# Patient Record
(demographics unavailable — no encounter records)

---

## 2024-10-18 NOTE — PLAN
[FreeTextEntry1] : follow up labs and ekg  drawn in office   not content with chol meds  would like to decrease to 5 mg  ascvd risk currently 7.72% discussed reason for increased dose  would like to decrease to 5 mg from 10 mg  cont. to work on diet/ exercise  follow up pending with cardio   quant for work encounter form

## 2024-10-18 NOTE — HEALTH RISK ASSESSMENT
[Good] : ~his/her~  mood as  good [No] : In the past 12 months have you used drugs other than those required for medical reasons? No [0] : 2) Feeling down, depressed, or hopeless: Not at all (0) [PHQ-2 Negative - No further assessment needed] : PHQ-2 Negative - No further assessment needed [Never] : Never [Patient reported mammogram was normal] : Patient reported mammogram was normal [Patient reported PAP Smear was normal] : Patient reported PAP Smear was normal [Patient reported bone density results were normal] : Patient reported bone density results were normal [Patient reported colonoscopy was normal] : Patient reported colonoscopy was normal [HIV test declined] : HIV test declined [Hepatitis C test declined] : Hepatitis C test declined [None] : None [Feels Safe at Home] : Feels safe at home [Fully functional (bathing, dressing, toileting, transferring, walking, feeding)] : Fully functional (bathing, dressing, toileting, transferring, walking, feeding) [Fully functional (using the telephone, shopping, preparing meals, housekeeping, doing laundry, using] : Fully functional and needs no help or supervision to perform IADLs (using the telephone, shopping, preparing meals, housekeeping, doing laundry, using transportation, managing medications and managing finances) [Audit-CScore] : 0 [de-identified] : walking [de-identified] : fruits, veggies  [TVI9Lopgw] : 0 [Change in mental status noted] : No change in mental status noted [Language] : denies difficulty with language [MammogramComments] : pending late Nov  [PapSmearComments] : pending in late Nov [ColonoscopyComments] : repeat in 2027  [BoneDensityComments] : pending in late Nov

## 2024-11-01 NOTE — HISTORY OF PRESENT ILLNESS
[FreeTextEntry1] : 64F w HTN, dilated aortic root (4 cm 2017, gene + pathogenic variant FBN1) presenting for follow up  11/1/24 FU: noticed pulsations in her anterior neck x 3 mos. saw PCP, thyroid work up negative. - denies worsening of FAIR (going up stairs) - feels a bit more fatigue with walking (but not affecting QOL) - reports some dizziness since losartan was started  2/16/24 FU:  - having lightheadedness, dizziness that she notices after taking coreg - FAIR still about the same - March - May 2024 plans to go back to [facility] - plan for CPET before or after trip  12/8/23 FU: Reports a pulling chest dull discomfort (not pain) persistently present x 1 month which she only notices when she is at rest, but forgets about it when doing activities - occasional sharp chest pains that last for a few minutes without clear triggers - feels tired, with a little short of breath needing to take a break for 30 secs after 5 blocks or 3 flights of stairs - may be a slight progression of symptoms  Aortic root dilation: - Has had elevated BPs in office visits but usually normalizes on rechecks. Has a BP cuff at home. Borderline but always hovering around 120/80. - Recent echo measurements of ao root 4-4.3 cm, asc ao 3.3-3.5 cm - Unable to view records from 2017  Valvular heart disease: MR - improved after some BP management between TTE and PEGGY AR - moderate LVIDs -  2.4 cm LVEF at least 55% - has been having occasional dizziness lasting for a few minutes about 3 x a day that started after she started taking amlodipine 5 mg (2 weeks now) - monitors at home: usual sBPs 145 mm hg before amlodipine - has not been checking since starting amlodipine  FH: Sister - marfan (unsure) w a hx of aortic dissection Brother - dilated cmp

## 2024-11-01 NOTE — PHYSICAL EXAM
[Well Developed] : well developed [Well Nourished] : well nourished [No Acute Distress] : no acute distress [Normal Conjunctiva] : normal conjunctiva [Normal Venous Pressure] : normal venous pressure [No Rub] : no rub [No Gallop] : no gallop [Clear Lung Fields] : clear lung fields [Good Air Entry] : good air entry [No Respiratory Distress] : no respiratory distress  [Soft] : abdomen soft [Non Tender] : non-tender [No Masses/organomegaly] : no masses/organomegaly [Normal Bowel Sounds] : normal bowel sounds [Normal Gait] : normal gait [No Edema] : no edema [No Cyanosis] : no cyanosis [No Clubbing] : no clubbing [No Varicosities] : no varicosities [No Rash] : no rash [No Skin Lesions] : no skin lesions [Moves all extremities] : moves all extremities [No Focal Deficits] : no focal deficits [Normal Speech] : normal speech [Alert and Oriented] : alert and oriented [Normal memory] : normal memory [Murmur] : murmur [de-identified] : +right anterior pulsating mass [de-identified] : + loud SHANDA with click aortic position

## 2024-11-01 NOTE — REASON FOR VISIT
[Structural Heart and Valve Disease] : structural heart and valve disease [FreeTextEntry1] : CV Data Reviewed:  TTE 10/19/23: LVEF 55% LVIDs 2.4cm, severe MR /76, excessive leaflet motion with billowing PEGGY 10/30/23: LVEF 55% moderate MR /59 with MVP mostly A2-P2, A3-P3. MV area during diastole 5.95. Moderate AR  ms. Moderate Ao root dilation 4.3 cm. Proximal asc ao 3.4 cm.  CTA aorta 11/21/23: aortic root measured at 4.2 cm, asc ao 3.5 cm CT Chest 2017: aortic root measured at 4 cm (indexed to BSA: 2.6 , normal max for age 2.53) CT Chest 2016: aortic root measured at 3.9 cm

## 2024-11-12 NOTE — DATA REVIEWED
[FreeTextEntry1] : Cardiac MR 12/13/23 Impression: 1. The left ventricle (LV) is dilated. Hypertrophy of the basal to mid septum measuring up to 12.4 mm. Left ventricular global systolic function is normal. The LV ejection fraction is 71 %. 2. The right ventricle (RV) is normal in size. RV global systolic function is normal. The RV ejection fraction is 69 %. 3. Mitral valve prolapse with evidence of mitral annular disjunction 5.6 mm; Mitral regurgitation with regurgitant volume of 34.28 mL and regurgitant fraction 30% 4. Dilated aortic root measuring 42 mm on 3 chamber view. 5. On delayed enhancement imaging, there gross myocardial scarring. The sequences used in this study were designed for imaging cardiac structures and are suboptimal for imaging other structures and organs.  11/27/23 labs reviewed: Hgb 12.2 Hct 37.1 Plt 253 Cr 0.67   TTE 10/23/23: Left ventricular wall thickness is normal. Left ventricular systolic function is normal with an ejection fraction visually estimated at 55 to 60 %. There are no regional wall motion abnormalities seen. Normal right ventricular cavity size, wall thickness, and systolic function. The left atrium is severely dilated in size. Severe mitral regurgitation at a blood pressure of 154/76 mmHg with a central-anteriorly directed jet. There is prolapse of both leaflets with possible P2 flail. Mild tricuspid regurgitation. Estimated pulmonary artery systolic pressure is 30 mmHg. Moderate aortic regurgitation. Aortic root at the sinuses of Valsalva is dilated, measuring 4.00 cm (indexed 2.61 cm/m). Ascending aorta diameter is dilated, measuring 3.30 cm (indexed 2.15 cm/m). No pericardial effusion seen.  PEGGY 10/30/23: Normal left and right ventricular size and systolic function. Moderate mitral regurgitation. There is mitral valve prolapse of the anterior and posterior leaflets (mostly A2-P2, A3-P3) with at least 2 separate central regurgitant jets. MV area during diastole is 5.95 cm?. Moderate aortic regurgitation. The aortic regurgitant jet is central. The pressure half time is approximately 450 ms. Moderately dilated aortic root measuring 4.30 cm (indexed to BSA = 2.79 cm/mc). The proximal ascending aorta is mildly dilated measuring 3.40 cm (indexed to BSA = 2.20 cm/m?). No LA/RA/RENE/RAA thrombus seen. No evidence of an intracardiac shunt. No pericardial effusion.  CTA Chest 11/17/23: Aortic measurements as noted above. Dilated aortic root.

## 2024-11-12 NOTE — HISTORY OF PRESENT ILLNESS
[FreeTextEntry1] : 66 year old female with a past medical hx of Marfan syndrome (gene + pathogenic variant FBN1), HTN, HLD, who presents for evaluation and management of dilated aortic root and valvular disease (AI and MR with LAE). Patient is under the care of Dr. Cortez. family hx of aortic dissection (sister). Seen by cardiologist with [facility]ner for significant clinical progression/change in valvular disease - new pulsating neck mass and loud murmur in aortic position concerning for severe AI.  TRANSTHORACIC ECHOCARDIOGRAM 11/1/2024 1. Left ventricular cavity is normal in size. Left ventricular wall thickness is normal. Left ventricular systolic function is normal with an ejection fraction of 63 % by Joel's method of disks. There are no regional wall motion abnormalities seen. 2. LVIDs is 27.3 mm. 3. Normal left ventricular diastolic function, with normal left ventricular filling pressure. 4. Normal right ventricular cavity size, with normal wall thickness, and normal right ventricular systolic function. 5. Left atrium is mildly dilated. 6. Moderate mitral regurgitation. 7. Moderate-to-severe aortic regurgitation with an eccentric jet. There is some doppler flow reversal in the aortic arch during diastole. 8. Estimated pulmonary artery systolic pressure is 54 mmHg. 9. Aortic root at the sinuses of Valsalva is dilated, measuring 4.00 cm (indexed 2.69 cm/m). 10. No pericardial effusion seen. CTA chest 11/2/2024 Stable mild dilatation of the aortic root 4.2 cm.    Patient works as an , independent with high level ADLs, lives alone in an apartment with elevator, and does not need any assist device with ambulation.

## 2024-11-22 NOTE — PROCEDURE
[FreeTextEntry1] : Patient was advised to view the educational video prior to this visit regarding aortic pathology, risk factors, surgical procedures, and lifestyle modifications. Video can be retrieved at [email or web address].

## 2024-11-22 NOTE — ASSESSMENT
[FreeTextEntry1] : 66 year old female with a past medical hx of Marfan syndrome (gene + pathogenic variant FBN1), HTN, HLD, who presents for evaluation and management of dilated aortic root and valvular disease (AI and MR with LAE). Family hx of Marfan and aortic dissection (sister). NYHA Class III.  Plan:  I have reviewed the patient's medical records, diagnostic images during the time of this office consultation and have made the following recommendation. I have reviewed the indications for surgery, and used our webpage [email or web address] <[email or web address]> to illustrate the aorta and anatomy of the heart. I have discussed the indications for surgery with the patient. Those indications are the following: size greater than 5.0 cm, symptomatic aneurysms, family history of aortic dissection or aneurysm death with a size greater than 4.5 cm, other necessary cardiac procedures such as coronary artery bypass grafting or valvular disorders with an aneurysm greater than 4.5 cm, or connective tissue disorders with an aneurysm size greater than 4.5 cm.   Given her symptoms of shortness of breath and her severe aortic regurgitation/mitral regurgitation with a dilated aortic root, the patient meets the indications. I had a lengthy discussion with the patient regarding her aneurysmal and valvular disease and progression. I have recommended that the patient is a candidate for an aortic root replacement with bioAVR, mitral valve repair/replacement.  I have discussed the risks, benefits and alternatives to surgery. I have explained the risks of the surgery, including approximately 2-3% major mortality or morbidity including stroke, infection, bleeding, death, renal failure and heart attack. All questions were addressed and patient agrees to proceed with surgery.    The patient was educated on various valve options.  I have described the mechanical valve prosthesis which does require Coumadin therapy with a daily pill as well as frequent lab tests. The mechanical valve has excellent longevity and is usually only removed in the cases of infective bacterial prosthetic valve endocarditis or pannus formation. Approximately over 90% of mechanical valves never need to be removed. However, there is a risk with Coumadin, a blood thinner, approximately a 1% thromboembolic risk per year. The On-x mechanical valve was also discussed, which requires a lower Coumadin dosage and INR therapeutic range. The other valve option is a biological valve. In general, approximately 50% of these need to be removed at approximately 15 years. However, these valves do not require Coumadin therapy and, therefore, do not have the associated risks of the blood thinner. I discussed that with the current use of Transcatheter Aortic Valve Replacement (TAVR), there is a possibility that future replacement of a failing bioprosthetic valve by TAVR may be an option. The Inspira and MagnaEase valves and their morphology fitted for future TAVRs was discussed as well.   The patient has chosen a bio prosthesis.   I have discussed the risks, benefits and alternatives to surgery. I have explained the risks of the surgery, including approximately 2-3% major mortality or morbidity including stroke, infection, bleeding, death, renal failure and heart attack. There is a 3% risk of requiring a PPM in patients with aortic valve intervention. There is a 20% risk of temporary atrial fibrillation post surgery. All questions were addressed and patient agrees to proceed with surgery.     --dental clearance (last dental checkup within 6 months)  --pt is a candidate for aortic root with bioAVR, mitral valve repair/replacement. admit the day before for right and left heart cath on 12/4, OR on 12/5. --PEGGY to evaluate mitral valve severity scheduled on 12/3.  --continue current medication regimen.  --Discussed signs and symptoms that warrant emergency medical attention. --continue cardiology care with Dr. Cortez. 
Chronic Conditions Affecting Care

## 2024-11-22 NOTE — HISTORY OF PRESENT ILLNESS
[FreeTextEntry1] : 66 year old female with a past medical hx of Marfan syndrome (gene + pathogenic variant FBN1), HTN, HLD, who presents for evaluation and management of dilated aortic root and valvular disease (AI and MR with LAE). Patient is under the care of Dr. Cortez. family hx of Marfan and aortic dissection (sister). NYHA Class III.  Patient was seen by her cardiologist with concerns for significant clinical progression/change in valvular disease - new pulsating neck mass and loud murmur in aortic position concerning for severe AI. Patient reports worsening fatigue, chest discomfort worse with exertions, and FAIR ambulating up 2 flights of stairs.   TRANSTHORACIC ECHOCARDIOGRAM 11/1/2024 1. Left ventricular cavity is normal in size. Left ventricular wall thickness is normal. Left ventricular systolic function is normal with an ejection fraction of 63 % by Joel's method of disks. There are no regional wall motion abnormalities seen. 2. LVIDs is 27.3 mm. LVIDd 4.5cm.  3. Normal left ventricular diastolic function, with normal left ventricular filling pressure. 4. Normal right ventricular cavity size, with normal wall thickness, and normal right ventricular systolic function. 5. Left atrium is mildly dilated. 6. Moderate mitral regurgitation. 7. Moderate-to-severe aortic regurgitation with an eccentric jet. There is some doppler flow reversal in the aortic arch during diastole. 8. Estimated pulmonary artery systolic pressure is 54 mmHg. 9. Aortic root at the sinuses of Valsalva is dilated, measuring 4.00 cm (indexed 2.69 cm/m). 10. No pericardial effusion seen.  CTA chest 11/2/2024 Stable mild dilatation of the aortic root 4.2 cm. descending aorta 1.8cm.   Patient works as an , independent with high level ADLs, lives alone in an apartment with elevator, and does not need any assist device with ambulation.

## 2024-11-22 NOTE — END OF VISIT
[FreeTextEntry3] : I, MICHAEL Diallo , personally performed the evaluation and management (E/M) services for this new patient.  That E/M includes conducting the clinically appropriate initial history &/or exam, assessing all conditions, and establishing the plan of care.  Today, my KATHE, was here to observe &/or participate in the visit & follow plan of care established by me.
Normal

## 2024-11-22 NOTE — PROCEDURE
[FreeTextEntry1] : Patient was advised to view the educational video prior to this visit regarding aortic pathology, risk factors, surgical procedures, and lifestyle modifications. Video can be retrieved at [email or web address]/[email or web address].

## 2024-11-22 NOTE — PROCEDURE
[FreeTextEntry1] : Patient was advised to view the educational video prior to this visit regarding aortic pathology, risk factors, surgical procedures, and lifestyle modifications. Video can be retrieved at [email or web address].

## 2024-11-22 NOTE — REASON FOR VISIT
[FreeTextEntry1] : Marfan syndrome, TAA, AR, MR - for aortic root replacement, mitral valve repair/replacement,

## 2024-12-18 NOTE — HISTORY OF PRESENT ILLNESS
[FreeTextEntry1] : [facility]: [facility]  Post discharge follow-up.   Operation Date: 12/5/24, Aortic Root Replacement (25 mm Bio Konect) Primary Surgeon/Attending MD: Dr. Lynch    66 year old female with PMHx of Marfans syndrome, HTN, HLD, moderate-severe AI referred to Dr. Lynch for surgical evaluation. She presented to [facility] on 12/4for planned cardiac cath prior to OR. She underwent aortic root replacement with Dr. Lynch on 12/5/24. Procedure was uncomplicated and she wastransferred to CT ICU post operatively intubated on low dose primacor. She was extubated overnight. Discharged to home on 12/14/2024.   Seen by [facility] for post discharge follow-up, she is in NAD, ambulating independently, endorses periods of dizziness in the morning which resolves on its own. On exam appears euvolemic, mild dehydration. Denies chest pain, SOB/FAIR, nausea/vomiting.

## 2024-12-18 NOTE — ASSESSMENT
[FreeTextEntry1] : Patient recovering well at home s/p Aortic Root Replacement (25 mm Bio Konect), accompanied by sister. Reviewed medication dosages with patient understanding. Patient has all medications in home and is taking as prescribed. Pain controlled with current medication regimen. No new symptoms, issues or concerns. Reports ambulating around home independently, without the use if assistive device. Denies chest pain, SOB/FAIR, nausea/vomiting, constipation/diarrhea. Mild dehydration adding low sodium rehydration liquid.   Plan of care  -DASH diet  - IS use and return demonstration done patient pulling TV of 1000; encouraged IS use 10x q1h to improve circulation and breathing.   -Shower let water run over incision use antibacterial soap and pat dry; avoid all creams, ointments or lotions leave open to air.   -Encourage increased ambulation to include outdoors; avoiding extreme temperatures.  -Start daily weights today; call immediately for weight gain >3lbs in a day/5lbs in a week.  [facility] team will continue to follow up with patient's status. NP/CCC roles explained with patient understanding, contact information provided. Patient agrees to call with any questions, issues or concerns. Worsening symptoms reviewed with patient with reiteration and understanding.

## 2024-12-18 NOTE — PHYSICAL EXAM
[Jugular Venous Distention Increased] : there was no jugular-venous distention [Respiration, Rhythm And Depth] : normal respiratory rhythm and effort [Exaggerated Use Of Accessory Muscles For Inspiration] : no accessory muscle use [Abnormal Walk] : normal gait [Oriented To Time, Place, And Person] : oriented to person, place, and time [Impaired Insight] : insight and judgment were intact [Affect] : the affect was normal [FreeTextEntry1] : generalized skin tears on chest, abdomen and right upper arm

## 2024-12-24 NOTE — HISTORY OF PRESENT ILLNESS
[Home] : at home, [unfilled] , at the time of the visit. [Medical Office: ([facility])___] : at the medical office located in  [Verbal consent obtained from patient] : the patient, [unfilled] [FreeTextEntry1] : hosp dc  [de-identified] : 65 yo f presents s.p hosp dc  was dc on 12/14 here to review meds  feeling better, ambulating well   had heart sx for MR  likely what was causing fatigue and pulsation in neck

## 2024-12-24 NOTE — HISTORY OF PRESENT ILLNESS
[Home] : at home, [unfilled] , at the time of the visit. [Medical Office: ([facility])___] : at the medical office located in  [Verbal consent obtained from patient] : the patient, [unfilled] [FreeTextEntry1] : hosp dc  [de-identified] : 65 yo f presents s.p hosp dc  was dc on 12/14 here to review meds  feeling better, ambulating well   had heart sx for MR  likely what was causing fatigue and pulsation in neck    Hematoma in setting of biopsy and plasma exchange.  s/p prbc transfusion 12/29

## 2024-12-24 NOTE — PLAN
[FreeTextEntry1] : reviewed hosp course and dc instructions with patient  follow up pending with cardio  reviewed medications with patient  follow up if any symptoms change  pending more PT at home

## 2025-01-03 NOTE — REASON FOR VISIT
[de-identified] : Aortic Root Replacement (25 mm Bio Konect)  [de-identified] : 12/5/24 [de-identified] : Intraop was uncomplicated and she was transferred to CT ICU post operatively intubated on low dose primacor. She was extubated overnight POD#0-1. On POD#1, her CI/CO was low and primacor was increased. She was transfused 1u PRBC. On POD#2 patient had postoperative Afib which converted with amiodarone bolus x3 and she was started on PO load. POD#3 EP was consulted for persistent Afib and she was dig loaded with improvement. POD#4 Primacor was weaned off and she was transferred to . POD 5 Converted back into afib over night but converted back to NSR during the day. Adan x2 removed. Post-op echo done with good position of aortic valve and moderate to severe MR.  On POD#6, POCUS performed showing L sided effusion so pigtail was placed with 400 cc of serosang fluid returned. She went back into rapid AF, requiring Amio bolus x 1. POD#7 Right sided small/moderate effusion, no window to drain, wires removed, NOAC started, LFTs slightly elevated, tylenol and statin stopped. POD#8, dig level 1.2 so started on 125 mcg of dig with good control of HR. Repeat echo post AC without effusion. On POD#9 patient is progressing well and per Dr. Holliday is medically ready to be discharged home.    CXR 1/2/25: Lungs clear. No infiltrate pleural effusion or pneumothorax. Postop changes cardiothoracic surgery. No acute bone abnormality. Vascular calcification.  Patient is recuperating well from surgery. She is ambulating and increasing her activities daily. She reports she was recovering well until she got a cold 1 week ago and the cough is disrupting her sleep. She has taken Robitussin and Benzonatate with intermittent relief. Patient denies fever, chills, dizziness, syncope, shortness of breath, chest pain, palpitations, or peripheral edema. [Family Member] : family member

## 2025-01-03 NOTE — REASON FOR VISIT
[de-identified] : Aortic Root Replacement (25 mm Bio Konect)  [de-identified] : 12/5/24 [de-identified] : Intraop was uncomplicated and she was transferred to CT ICU post operatively intubated on low dose primacor. She was extubated overnight POD#0-1. On POD#1, her CI/CO was low and primacor was increased. She was transfused 1u PRBC. On POD#2 patient had postoperative Afib which converted with amiodarone bolus x3 and she was started on PO load. POD#3 EP was consulted for persistent Afib and she was dig loaded with improvement. POD#4 Primacor was weaned off and she was transferred to . POD 5 Converted back into afib over night but converted back to NSR during the day. Adan x2 removed. Post-op echo done with good position of aortic valve and moderate to severe MR.  On POD#6, POCUS performed showing L sided effusion so pigtail was placed with 400 cc of serosang fluid returned. She went back into rapid AF, requiring Amio bolus x 1. POD#7 Right sided small/moderate effusion, no window to drain, wires removed, NOAC started, LFTs slightly elevated, tylenol and statin stopped. POD#8, dig level 1.2 so started on 125 mcg of dig with good control of HR. Repeat echo post AC without effusion. On POD#9 patient is progressing well and per Dr. Holliday is medically ready to be discharged home.    CXR 1/2/25: Lungs clear. No infiltrate pleural effusion or pneumothorax. Postop changes cardiothoracic surgery. No acute bone abnormality. Vascular calcification.  Patient is recuperating well from surgery. She is ambulating and increasing her activities daily. She reports she was recovering well until she got a cold 1 week ago and the cough is disrupting her sleep. She has taken Robitussin and Benzonatate with intermittent relief. Patient denies fever, chills, dizziness, syncope, shortness of breath, chest pain, palpitations, or peripheral edema. [Family Member] : family member

## 2025-01-03 NOTE — ASSESSMENT
[FreeTextEntry1] : 67 yo F who is status post Aortic Root Replacement (25 mm Bio Konect) on 12/5/24 presents for post op visit.   - Follow up with PCP/Cardiologist Dr. Cortez. - Continue current medication regimen. Recommended Mucinex to help with phlegm, if cough persists into next week will prescribe antibiotic.  - Recommend antibiotic prophylaxis prior to dental procedures, advised waiting until 3 months post op for root canal unless it is urgent.  - Continue to increase activity and walk daily as tolerated. Continue to use incentive spirometer. - No driving or strenuous activity for six weeks after surgery. Avoid lifting >10 to 15lbs for first two months after surgery. - Continue to use compression stockings. Keep legs elevated above heart when resting/sitting/sleeping. - Call MD if you experience fever, fatigue, dizziness, confusion, syncope, shortness of breath, chest pain not relieved with analgesics, increased redness/drainage from incision. - Follow up in CTS clinic in 1 yr with CTA chest.

## 2025-01-03 NOTE — REASON FOR VISIT
[de-identified] : Aortic Root Replacement (25 mm Bio Konect)  [de-identified] : 12/5/24 [de-identified] : Intraop was uncomplicated and she was transferred to CT ICU post operatively intubated on low dose primacor. She was extubated overnight POD#0-1. On POD#1, her CI/CO was low and primacor was increased. She was transfused 1u PRBC. On POD#2 patient had postoperative Afib which converted with amiodarone bolus x3 and she was started on PO load. POD#3 EP was consulted for persistent Afib and she was dig loaded with improvement. POD#4 Primacor was weaned off and she was transferred to . POD 5 Converted back into afib over night but converted back to NSR during the day. Adan x2 removed. Post-op echo done with good position of aortic valve and moderate to severe MR.  On POD#6, POCUS performed showing L sided effusion so pigtail was placed with 400 cc of serosang fluid returned. She went back into rapid AF, requiring Amio bolus x 1. POD#7 Right sided small/moderate effusion, no window to drain, wires removed, NOAC started, LFTs slightly elevated, tylenol and statin stopped. POD#8, dig level 1.2 so started on 125 mcg of dig with good control of HR. Repeat echo post AC without effusion. On POD#9 patient is progressing well and per Dr. Holliday is medically ready to be discharged home.    CXR 1/2/25: Lungs clear. No infiltrate pleural effusion or pneumothorax. Postop changes cardiothoracic surgery. No acute bone abnormality. Vascular calcification.  Patient is recuperating well from surgery. She is ambulating and increasing her activities daily. She reports she was recovering well until she got a cold 1 week ago and the cough is disrupting her sleep. She has taken Robitussin and Benzonatate with intermittent relief. Patient denies fever, chills, dizziness, syncope, shortness of breath, chest pain, palpitations, or peripheral edema. [Family Member] : family member

## 2025-01-03 NOTE — ASSESSMENT
[FreeTextEntry1] : 65 yo F who is status post Aortic Root Replacement (25 mm Bio Konect) on 12/5/24 presents for post op visit.   - Follow up with PCP/Cardiologist Dr. Cortez. - Continue current medication regimen. Recommended Mucinex to help with phlegm, if cough persists into next week will prescribe antibiotic.  - Recommend antibiotic prophylaxis prior to dental procedures, advised waiting until 3 months post op for root canal unless it is urgent.  - Continue to increase activity and walk daily as tolerated. Continue to use incentive spirometer. - No driving or strenuous activity for six weeks after surgery. Avoid lifting >10 to 15lbs for first two months after surgery. - Continue to use compression stockings. Keep legs elevated above heart when resting/sitting/sleeping. - Call MD if you experience fever, fatigue, dizziness, confusion, syncope, shortness of breath, chest pain not relieved with analgesics, increased redness/drainage from incision. - Follow up in CTS clinic in 1 yr with CTA chest.

## 2025-01-08 NOTE — PHYSICAL EXAM
[Well Developed] : well developed [Well Nourished] : well nourished [No Acute Distress] : no acute distress [Normal Conjunctiva] : normal conjunctiva [Normal Venous Pressure] : normal venous pressure [No Carotid Bruit] : no carotid bruit [Normal S1, S2] : normal S1, S2 [No Rub] : no rub [No Gallop] : no gallop [Clear Lung Fields] : clear lung fields [Good Air Entry] : good air entry [No Respiratory Distress] : no respiratory distress  [Soft] : abdomen soft [Non Tender] : non-tender [No Masses/organomegaly] : no masses/organomegaly [Normal Bowel Sounds] : normal bowel sounds [Normal Gait] : normal gait [No Edema] : no edema [No Cyanosis] : no cyanosis [No Clubbing] : no clubbing [No Varicosities] : no varicosities [No Rash] : no rash [No Skin Lesions] : no skin lesions [Moves all extremities] : moves all extremities [No Focal Deficits] : no focal deficits [Normal Speech] : normal speech [Alert and Oriented] : alert and oriented [Normal memory] : normal memory [Murmur] : murmur [de-identified] : + SHANDA w click + healing sternal incision

## 2025-01-08 NOTE — REASON FOR VISIT
[FreeTextEntry1] : CV Data Reviewed:  TTE 10/19/23: LVEF 55% LVIDs 2.4cm, severe MR /76, excessive leaflet motion with billowing PEGGY 10/30/23: LVEF 55% moderate MR /59 with MVP mostly A2-P2, A3-P3. MV area during diastole 5.95.  PEGGY 12/2024: unchanged from prior PEGGY Moderate AR  ms. Moderate Ao root dilation 4.3 cm. Proximal asc ao 3.4 cm.  CTA aorta 11/21/23: aortic root measured at 4.2 cm, asc ao 3.5 cm CT Chest 2017: aortic root measured at 4 cm (indexed to BSA: 2.6 , normal max for age 2.53) CT Chest 2016: aortic root measured at 3.9 cm

## 2025-01-08 NOTE — HISTORY OF PRESENT ILLNESS
[FreeTextEntry1] : 64F w HTN, dilated aortic root (4 cm 2017, gene + pathogenic variant FBN1 Marfan) presenting for follow up  12/8/23 FU: Reports a pulling chest dull discomfort (not pain) persistently present x 1 month which she only notices when she is at rest, but forgets about it when doing activities - occasional sharp chest pains that last for a few minutes without clear triggers - feels tired, with a little short of breath needing to take a break for 30 secs after 5 blocks or 3 flights of stairs - may be a slight progression of symptoms  2/16/24 FU:  - having lightheadedness, dizziness that she notices after taking coreg - FAIR still about the same - March - May 2024 plans to go back to [facility] - plan for CPET before or after trip  11/1/24 FU: noticed pulsations in her anterior neck x 3 mos. saw PCP, thyroid work up negative. - denies worsening of FAIR (going up stairs) - feels a bit more fatigue with walking (but not affecting QOL) - reports some dizziness since losartan was started  12/5/24 OR: s/p Replacement, aortic root, with composite graft, AVR 25mm bio (Inspiris, Klein) (Zachery Lynch, López Cuellar)  1/8/25 FU: 1 month post op FU  - noted to have had post op afib, treated with amio, dig - still taking it easy, so no strenuous activity so no exertional sx to be reported - was sick with URI x 2 weeks, still coughing but getting better - sometimes feels palpitations but less frequent compared to post op period - plan for root canal in Feb 5 - will need antibiotic prophylaxis - no bleeding issues with apixaban 5 - still wearing monitor until 1/12/25  Aortic root dilation: - Has had elevated BPs in office visits but usually normalizes on rechecks. Has a BP cuff at home. Borderline but always hovering around 120/80. - Recent echo measurements of ao root 4-4.3 cm, asc ao 3.3-3.5 cm - Unable to view records from 2017  Valvular heart disease: MR - improved after some BP management between TTE and PEGGY AR - moderate-severe -- s/p AVR and aortic root replacement 12/5/24  FH: Sister - marfan (unsure) w a hx of aortic dissection Brother - dilated cmp

## 2025-02-12 NOTE — HISTORY OF PRESENT ILLNESS
[FreeTextEntry1] : 64F w HTN, dilated aortic root (4 cm 2017, gene + pathogenic variant FBN1) presenting for follow up  12/8/23 FU: Reports a pulling chest dull discomfort (not pain) persistently present x 1 month which she only notices when she is at rest, but forgets about it when doing activities - occasional sharp chest pains that last for a few minutes without clear triggers - feels tired, with a little short of breath needing to take a break for 30 secs after 5 blocks or 3 flights of stairs - may be a slight progression of symptoms  2/16/24 FU:  - having lightheadedness, dizziness that she notices after taking coreg - FAIR still about the same - March - May 2024 plans to go back to [facility] [place] - plan for CPET before or after trip  11/1/24 FU: noticed pulsations in her anterior neck x 3 mos. saw PCP, thyroid work up negative. - denies worsening of FAIR (going up stairs) - feels a bit more fatigue with walking (but not affecting QOL) - reports some dizziness since losartan was started  1/8/25 FU: 1 month post op - post op afib, treated with amio, dig (stopped this visit) - still taking it easy, so no strenuous activity - was sick with URI x 2 weeks - sometimes feels palpitations but less frequent compared to post op period - plan for root canal in Feb 5 - will need antibiotic prophylaxis - no bleeding issues with apixaban 5 - still wearing monitor until 1/12/25 2/7/25 FU: 2 mos post op. Again noticing pulsation in her neck. Denies HF symptoms and euvolemic on exam. No palps. Returned monitor on 1/15/25. Unable to view report  Valvular heart disease: MR - improved after some BP management between TTE and PEGGY AR - replaced w bio ispiris Klein 25 mm 12/5/24  FH: Sister - marfan (unsure) w a hx of aortic dissection Brother - dilated cmp

## 2025-02-12 NOTE — ADDENDUM
[FreeTextEntry1] : Reviewed 2/11/25 TTE: Bio AV normal fxn, no regurg MR similar to previous mod-sev but now with larger LA PASP 25 LVEF, LVIDs similar to prior 27-Nov-2023

## 2025-02-12 NOTE — ADDENDUM
[FreeTextEntry1] : Reviewed 2/11/25 TTE: Bio AV normal fxn, no regurg MR similar to previous mod-sev but now with larger LA PASP 25 LVEF, LVIDs similar to prior

## 2025-02-12 NOTE — PHYSICAL EXAM
[Well Developed] : well developed [Well Nourished] : well nourished [No Acute Distress] : no acute distress [Normal Conjunctiva] : normal conjunctiva [Normal Venous Pressure] : normal venous pressure [No Carotid Bruit] : no carotid bruit [Normal S1, S2] : normal S1, S2 [No Murmur] : no murmur [No Rub] : no rub [No Gallop] : no gallop [Clear Lung Fields] : clear lung fields [Good Air Entry] : good air entry [No Respiratory Distress] : no respiratory distress  [Soft] : abdomen soft [Non Tender] : non-tender [No Masses/organomegaly] : no masses/organomegaly [Normal Bowel Sounds] : normal bowel sounds [Normal Gait] : normal gait [No Edema] : no edema [No Cyanosis] : no cyanosis [No Clubbing] : no clubbing [No Varicosities] : no varicosities [No Rash] : no rash [No Skin Lesions] : no skin lesions [Moves all extremities] : moves all extremities [No Focal Deficits] : no focal deficits [Normal Speech] : normal speech [Alert and Oriented] : alert and oriented [Normal memory] : normal memory [de-identified] : + pulsating mass on R neck

## 2025-02-12 NOTE — HISTORY OF PRESENT ILLNESS
[FreeTextEntry1] : 64F w HTN, dilated aortic root (4 cm 2017, gene + pathogenic variant FBN1) presenting for follow up  12/8/23 FU: Reports a pulling chest dull discomfort (not pain) persistently present x 1 month which she only notices when she is at rest, but forgets about it when doing activities - occasional sharp chest pains that last for a few minutes without clear triggers - feels tired, with a little short of breath needing to take a break for 30 secs after 5 blocks or 3 flights of stairs - may be a slight progression of symptoms  2/16/24 FU:  - having lightheadedness, dizziness that she notices after taking coreg - FAIR still about the same - March - May 2024 plans to go back to [facility] - plan for CPET before or after trip  11/1/24 FU: noticed pulsations in her anterior neck x 3 mos. saw PCP, thyroid work up negative. - denies worsening of FAIR (going up stairs) - feels a bit more fatigue with walking (but not affecting QOL) - reports some dizziness since losartan was started  1/8/25 FU: 1 month post op - post op afib, treated with amio, dig (stopped this visit) - still taking it easy, so no strenuous activity - was sick with URI x 2 weeks - sometimes feels palpitations but less frequent compared to post op period - plan for root canal in Feb 5 - will need antibiotic prophylaxis - no bleeding issues with apixaban 5 - still wearing monitor until 1/12/25 2/7/25 FU: 2 mos post op. Again noticing pulsation in her neck. Denies HF symptoms and euvolemic on exam. No palps. Returned monitor on 1/15/25. Unable to view report  Valvular heart disease: MR - improved after some BP management between TTE and PEGGY AR - replaced w bio ispiris Klein 25 mm 12/5/24  FH: Sister - marfan (unsure) w a hx of aortic dissection Brother - dilated cmp

## 2025-02-12 NOTE — HISTORY OF PRESENT ILLNESS
[FreeTextEntry1] : 64F w HTN, dilated aortic root (4 cm 2017, gene + pathogenic variant FBN1) presenting for follow up  12/8/23 FU: Reports a pulling chest dull discomfort (not pain) persistently present x 1 month which she only notices when she is at rest, but forgets about it when doing activities - occasional sharp chest pains that last for a few minutes without clear triggers - feels tired, with a little short of breath needing to take a break for 30 secs after 5 blocks or 3 flights of stairs - may be a slight progression of symptoms  2/16/24 FU:  - having lightheadedness, dizziness that she notices after taking coreg - FAIR still about the same - March - May 2024 plans to go back to [facility] - plan for CPET before or after trip  11/1/24 FU: noticed pulsations in her anterior neck x 3 mos. saw PCP, thyroid work up negative. - denies worsening of FAIR (going up stairs) - feels a bit more fatigue with walking (but not affecting QOL) - reports some dizziness since losartan was started  1/8/25 FU: 1 month post op - post op afib, treated with amio, dig (stopped this visit) - still taking it easy, so no strenuous activity - was sick with URI x 2 weeks - sometimes feels palpitations but less frequent compared to post op period - plan for root canal in Feb 5 - will need antibiotic prophylaxis - no bleeding issues with apixaban 5 - still wearing monitor until 1/12/25 2/7/25 FU: 2 mos post op. Again noticing pulsation in her neck. Denies HF symptoms and euvolemic on exam. No palps. Returned monitor on 1/15/25. Unable to view report  Valvular heart disease: MR - improved after some BP management between TTE and PEGGY AR - replaced w bio ispiris Klein 25 mm 12/5/24  FH: Sister - marfan (unsure) w a hx of aortic dissection Brother - dilated cmp

## 2025-02-12 NOTE — PHYSICAL EXAM
[Well Developed] : well developed [Well Nourished] : well nourished [No Acute Distress] : no acute distress [Normal Conjunctiva] : normal conjunctiva [Normal Venous Pressure] : normal venous pressure [No Carotid Bruit] : no carotid bruit [Normal S1, S2] : normal S1, S2 [No Murmur] : no murmur [No Rub] : no rub [No Gallop] : no gallop [Clear Lung Fields] : clear lung fields [Good Air Entry] : good air entry [No Respiratory Distress] : no respiratory distress  [Soft] : abdomen soft [Non Tender] : non-tender [No Masses/organomegaly] : no masses/organomegaly [Normal Bowel Sounds] : normal bowel sounds [Normal Gait] : normal gait [No Edema] : no edema [No Cyanosis] : no cyanosis [No Clubbing] : no clubbing [No Varicosities] : no varicosities [No Rash] : no rash [No Skin Lesions] : no skin lesions [Moves all extremities] : moves all extremities [No Focal Deficits] : no focal deficits [Normal Speech] : normal speech [Alert and Oriented] : alert and oriented [Normal memory] : normal memory [de-identified] : + pulsating mass on R neck

## 2025-02-12 NOTE — PHYSICAL EXAM
[Well Developed] : well developed [Well Nourished] : well nourished [No Acute Distress] : no acute distress [Normal Conjunctiva] : normal conjunctiva [Normal Venous Pressure] : normal venous pressure [No Carotid Bruit] : no carotid bruit [Normal S1, S2] : normal S1, S2 [No Murmur] : no murmur [No Rub] : no rub [No Gallop] : no gallop [Clear Lung Fields] : clear lung fields [Good Air Entry] : good air entry [No Respiratory Distress] : no respiratory distress  [Soft] : abdomen soft [Non Tender] : non-tender [No Masses/organomegaly] : no masses/organomegaly [Normal Bowel Sounds] : normal bowel sounds [Normal Gait] : normal gait [No Edema] : no edema [No Cyanosis] : no cyanosis [No Clubbing] : no clubbing [No Varicosities] : no varicosities [No Rash] : no rash [No Skin Lesions] : no skin lesions [Moves all extremities] : moves all extremities [No Focal Deficits] : no focal deficits [Normal Speech] : normal speech [Alert and Oriented] : alert and oriented [Normal memory] : normal memory [de-identified] : + pulsating mass on R neck

## 2025-03-07 NOTE — PLAN
[FreeTextEntry1] : bp stable, cont meds  feeling better  reviewed previous labs, low wbc, elevated ldl  here for repeat labs  osteoporosis-- follow up with endo pending, interested in restarting meds for osteoporosis, reviewed risks benefits side effects alternatives regimen-- aware of osteonecrosis of jaw and need to drink large glass of water and stay upright for 45 min post taking meds

## 2025-03-07 NOTE — HISTORY OF PRESENT ILLNESS
[FreeTextEntry1] : follow up  [de-identified] : 67 yo f presents to follow up  here for blood work  here to start meds for osteoporosis

## 2025-03-19 NOTE — HISTORY OF PRESENT ILLNESS
[FreeTextEntry1] : 64F w HTN, dilated aortic root (4 cm 2017, gene + pathogenic variant FBN1) presenting for follow up  12/8/23 FU: Reports a pulling chest dull discomfort (not pain) persistently present x 1 month which she only notices when she is at rest, but forgets about it when doing activities - occasional sharp chest pains that last for a few minutes without clear triggers - feels tired, with a little short of breath needing to take a break for 30 secs after 5 blocks or 3 flights of stairs - may be a slight progression of symptoms  2/16/24 FU: having lightheadedness, dizziness that she notices after taking coreg - FAIR still about the same - March - May 2024 plans to go back to [facility]  11/1/24 FU: noticed pulsations in her anterior neck x 3 mos. 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 Again noticing pulsation in her neck. Denies HF symptoms and euvolemic on exam. No palps. Returned monitor on 1/15/25. Unable to view report  3/19/25 FU: still short of breath with exertion, no difference in her symptoms before and after surgery  Valvular heart disease: MR - improved after some BP management between TTE and PEGGY AR - replaced w bio ispiris Klein 25 mm 12/5/24  FH: Sister - marfan (unsure) w a hx of aortic dissection Brother - dilated cmp

## 2025-03-19 NOTE — HISTORY OF PRESENT ILLNESS
[FreeTextEntry1] : 64F w HTN, dilated aortic root (4 cm 2017, gene + pathogenic variant FBN1) presenting for follow up  12/8/23 FU: Reports a pulling chest dull discomfort (not pain) persistently present x 1 month which she only notices when she is at rest, but forgets about it when doing activities - occasional sharp chest pains that last for a few minutes without clear triggers - feels tired, with a little short of breath needing to take a break for 30 secs after 5 blocks or 3 flights of stairs - may be a slight progression of symptoms  2/16/24 FU: having lightheadedness, dizziness that she notices after taking coreg - FAIR still about the same - March - May 2024 plans to go back to [facility]  11/1/24 FU: noticed pulsations in her anterior neck x 3 mos. 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 Again noticing pulsation in her neck. Denies HF symptoms and euvolemic on exam. No palps. Returned monitor on 1/15/25. Unable to view report  3/19/25 FU: still short of breath with exertion, no difference in her symptoms before and after surgery  Valvular heart disease: MR - improved after some BP management between TTE and PEGGY AR - replaced w bio ispiris Klein 25 mm 12/5/24  FH: Sister - marfan (unsure) w a hx of aortic dissection Brother - dilated cmp

## 2025-03-19 NOTE — REASON FOR VISIT
[FreeTextEntry1] : CV Data Reviewed: TTE 10/19/23: LVEF 55% LVIDs 2.4cm, severe MR /76, excessive leaflet motion with billowing PEGGY 10/30/23: LVEF 55% moderate MR /59 with MVP mostly A2-P2, A3-P3. MV area during diastole 5.95.  PEGGY 12/2024: unchanged from prior PEGGY TTE 2/11/2025 Moderate AR  ms. Moderate Ao root dilation 4.3 cm. Proximal asc ao 3.4 cm.  CTA aorta 11/21/23: aortic root measured at 4.2 cm, asc ao 3.5 cm CT Chest 2017: aortic root measured at 4 cm (indexed to BSA: 2.6 , normal max for age 2.53) CT Chest 2016: aortic root measured at 3.9 cm

## 2025-03-19 NOTE — PHYSICAL EXAM
[Well Developed] : well developed [Well Nourished] : well nourished [No Acute Distress] : no acute distress [Normal Conjunctiva] : normal conjunctiva [Normal Venous Pressure] : normal venous pressure [No Carotid Bruit] : no carotid bruit [Normal S1, S2] : normal S1, S2 [No Murmur] : no murmur [No Rub] : no rub [No Gallop] : no gallop [Clear Lung Fields] : clear lung fields [Good Air Entry] : good air entry [No Respiratory Distress] : no respiratory distress  [Soft] : abdomen soft [Non Tender] : non-tender [No Masses/organomegaly] : no masses/organomegaly [Normal Bowel Sounds] : normal bowel sounds [Normal Gait] : normal gait [No Edema] : no edema [No Cyanosis] : no cyanosis [No Clubbing] : no clubbing [No Varicosities] : no varicosities [No Rash] : no rash [No Skin Lesions] : no skin lesions [Moves all extremities] : moves all extremities [No Focal Deficits] : no focal deficits [Normal Speech] : normal speech [Alert and Oriented] : alert and oriented [Normal memory] : normal memory [de-identified] : + pulsating mass on R neck

## 2025-03-19 NOTE — PHYSICAL EXAM
[Well Developed] : well developed [Well Nourished] : well nourished [No Acute Distress] : no acute distress [Normal Conjunctiva] : normal conjunctiva [Normal Venous Pressure] : normal venous pressure [No Carotid Bruit] : no carotid bruit [Normal S1, S2] : normal S1, S2 [No Murmur] : no murmur [No Rub] : no rub [No Gallop] : no gallop [Clear Lung Fields] : clear lung fields [Good Air Entry] : good air entry [No Respiratory Distress] : no respiratory distress  [Soft] : abdomen soft [Non Tender] : non-tender [No Masses/organomegaly] : no masses/organomegaly [Normal Bowel Sounds] : normal bowel sounds [Normal Gait] : normal gait [No Edema] : no edema [No Cyanosis] : no cyanosis [No Clubbing] : no clubbing [No Varicosities] : no varicosities [No Rash] : no rash [No Skin Lesions] : no skin lesions [Moves all extremities] : moves all extremities [No Focal Deficits] : no focal deficits [Normal Speech] : normal speech [Alert and Oriented] : alert and oriented [Normal memory] : normal memory [de-identified] : + pulsating mass on R neck

## 2025-05-23 NOTE — ASSESSMENT
[FreeTextEntry1] : 1. Severe Osteoporosis - Severe osteoporosis with T-score of -3.6 in the lumbar spine, placing the patient at high risk for fractures, which warrants consideration of anabolic therapy. - We had a long discussion about anabolic versus antiresorptive agents and the risks, benefits and contraindications of each therapy.  - I explained the risks and benefits of starting bisphosphonate therapy, including hypocalcemia, esophagitis, osteonecrosis of the jaw, and atypical femur fracture.  - We also discussed risks, benefits and contraindications of teriparatide were discussed, including cutaneous calcification, orthostatic hypotension, hypercalcemia, osteosarcoma (seen in rats, not observed in humans). The patient denies having a history of radiation therapy. - Additionally, we talked about Romozosumab and the r/b/c, including arthralgia, headache, atypical femur fracture, osteonecrosis of the jaw, hypocalcemia, hypersensitivity, and a possible increased risk of MI/stroke, and cardiovascular death. The patient denies having a history of MI/stroke in the past. - The patient wants to proceed transitioning to Forteo daily injections.  - Recommended transition from alendronate to anabolic therapy with Forteo (teriparatide) daily subcutaneous injections for 2 years, followed by transition back to antiresorptive therapy. Will submit prescription for Forteo on 06/01/2025 when patient's new Medicare insurance becomes effective. In the meantime, patient to continue alendronate.  - Recommended resumption of calcium supplementation at 1,000 mg daily (divided as 500 mg twice daily) and vitamin D 800 IU daily.  - Follow-up appointments: Return in four months. 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  RTC in 4 months

## 2025-05-23 NOTE — ADDENDUM
[FreeTextEntry1] : Time-Based Billing: I have spent 46 minutes on the encounter. This includes time spent with the patient during the visit as well as time spent before and after the visit reviewing the chart, documenting the encounter, reviewing studies, etc.

## 2025-05-23 NOTE — HISTORY OF PRESENT ILLNESS
[FreeTextEntry1] : Ms. BARB VELAZQUEZ is a 66-year-old woman with osteoporosis, hypertension, dilated aortic root (4 cm 2017, gene + pathogenic variant FBN1) who presents to the clinic to establish care.  OSTEOPOROSIS - The patient was diagnosed with osteoporosis 11/2024. Started on alendronate 70 mg weekly since March 2025.  - Initial DXA scan (11/26/24) showing:      > Lumbar spine (L1-L4) T-score of -3.6 (BMD 0.75 g/cm2).      > Left femoral neck T-score of -2.6 (BMD 0.68 g/cm2).      > Left total hip T-score -2.2 (BMD 0.73 g/cm2).       > Comparison 10/1/22: BMD of the lumbar spine is markedly osteoporotic with a 6% interval decrease. The BMD of the left femoral neck mildly osteoporotic with a 5% interval decrease.        > Lateral vertebral assessment: There is marked accentuation of the thoracic kyphosis and accentuation of the lumbar lordosis. The vertebral bodies are intact.  - Previous fracture Denies. - Alcohol (3 or more units/day): Denies. - Current smoking: Denies. - Glucocorticoid use: Denies. - Rheumatoid arthritis: Denies. - Parent fractured hip: Denies. - FRAX score: Major osteoporotic 12%. Hip fracture 3.1%. - Menopause: 52-53 years old. Didn't take hormone replacement therapy - History of cancer/radiation therapy: Denies. - Last dental exam: February 2025, no dental issues, no plans for invasive dental procedures. - Last fall: Around 61 years old, slipped with ice in the sidewalk, no fractures only a bruise. - GERD: Denies. - Not taking calcium or vitamin D   Most recent labs: - Calcium: 9.3 mg/dL - Vitamin D 25-OH: 26.1 ng/mL. - eGFR: 61 mL/min/1.73m2.  Has history of atrial fibrillation currently on amiodarone since December 2024 after her recent heart surgery. Last TFT were within normal limits in October 2024.

## 2025-05-23 NOTE — PHYSICAL EXAM
[Alert] : alert [Well Nourished] : well nourished [No Acute Distress] : no acute distress [Normal Sclera/Conjunctiva] : normal sclera/conjunctiva [No Proptosis] : no proptosis [Thyroid Not Enlarged] : the thyroid was not enlarged [No Thyroid Nodules] : no palpable thyroid nodules [No Respiratory Distress] : no respiratory distress [No Accessory Muscle Use] : no accessory muscle use [Clear to Auscultation] : lungs were clear to auscultation bilaterally [Normal S1, S2] : normal S1 and S2 [Normal Rate] : heart rate was normal [Regular Rhythm] : with a regular rhythm [No Edema] : no peripheral edema [Not Tender] : non-tender [Soft] : abdomen soft [No Stigmata of Cushings Syndrome] : no stigmata of Cushings Syndrome [Normal Strength/Tone] : muscle strength and tone were normal [Oriented x3] : oriented to person, place, and time [No Spinal Tenderness] : no spinal tenderness

## 2025-07-23 NOTE — HISTORY OF PRESENT ILLNESS
[FreeTextEntry1] : 64F w HTN, dilated aortic root (4 cm 2017, gene + pathogenic variant FBN1) s/p replacement (25 mm Bio Konect) w AVR (Inspiris Klein 25 mm) in 12/2024 who is presenting for follow up  12/8/23 FU: + pulling chest dull discomfort x 1 month at rest, short of breath  2/16/24 FU: having lightheadedness, dizziness that she notices after taking coreg - FAIR still about the same - March - May 2024 plans to go back to [facility] 11/1/24 FU: + pulsations in her anterior neck x 3 mos.  - denies worsening of FAIR but more fatigue with walking (not affecting QOL) - reports some dizziness since losartan was started 12/5/24: aortic root replacement 25 mm Bio Konect , AVR bio ispiris Klein 25 mm (Dr Lynch) 1/8/25 FU: 1 month post op - post op afib, treated with amio, dig (stopped this visit) - was sick with URI x 2 weeks - occ'l palpitations but less frequent compared to post op period - plan for root canal in Feb 5 - will need antibiotic prophylaxis - no bleeding issues with apixaban 5 - still wearing monitor until 1/12/25 2/7/25 FU: 2 mos post op. + pulsation in her neck. Denies HF symptoms and euvolemic on exam. No palps. Returned monitor on 1/15/25. Unable to view report 3/19/25 FU: still short of breath with exertion, no difference in her symptoms before and after surgery  7/23/25 FU: 8 mos post op. feels well in general. FAIR similar. No bleeding issues. No palpitations.  FH: Sister - marfan (unsure) w a hx of aortic dissection;  Brother - dilated cmp SH: never smoker

## 2025-07-23 NOTE — REASON FOR VISIT
[Structural Heart and Valve Disease] : structural heart and valve disease [FreeTextEntry1] : CV Data Reviewed: TTE 10/19/23: LVEF 55% LVIDs 2.4cm, severe MR /76, excessive leaflet motion with billowing PEGGY 10/30/23: LVEF 55% moderate MR /59 with MVP mostly A2-P2, A3-P3. MV area during diastole 5.95.  PEGGY 12/2024: unchanged from prior PEGGY TTE 2/11/2025 Moderate AR  ms. Moderate Ao root dilation 4.3 cm. Proximal asc ao 3.4 cm.  CTA aorta 11/21/23: aortic root measured at 4.2 cm, asc ao 3.5 cm CT Chest 2017: aortic root measured at 4 cm (indexed to BSA: 2.6 , normal max for age 2.53) CT Chest 2016: aortic root measured at 3.9 cm

## 2025-07-23 NOTE — REVIEW OF SYSTEMS
[Dyspnea on exertion] : dyspnea during exertion [Negative] : Heme/Lymph [FreeTextEntry5] : + neck pulsation

## 2025-07-23 NOTE — PHYSICAL EXAM
